# Patient Record
Sex: MALE | Race: WHITE | Employment: FULL TIME | URBAN - METROPOLITAN AREA
[De-identification: names, ages, dates, MRNs, and addresses within clinical notes are randomized per-mention and may not be internally consistent; named-entity substitution may affect disease eponyms.]

---

## 2017-01-31 PROCEDURE — 82746 ASSAY OF FOLIC ACID SERUM: CPT | Performed by: INTERNAL MEDICINE

## 2017-01-31 PROCEDURE — 82607 VITAMIN B-12: CPT | Performed by: INTERNAL MEDICINE

## 2017-12-27 PROCEDURE — 87086 URINE CULTURE/COLONY COUNT: CPT | Performed by: FAMILY MEDICINE

## 2019-03-11 PROCEDURE — 82785 ASSAY OF IGE: CPT | Performed by: INTERNAL MEDICINE

## 2019-03-11 PROCEDURE — 86003 ALLG SPEC IGE CRUDE XTRC EA: CPT | Performed by: INTERNAL MEDICINE

## 2021-05-30 PROBLEM — R10.32 LEFT LOWER QUADRANT PAIN: Status: ACTIVE | Noted: 2021-05-30

## 2022-08-06 ENCOUNTER — LAB ENCOUNTER (OUTPATIENT)
Dept: LAB | Age: 52
End: 2022-08-06
Attending: INTERNAL MEDICINE
Payer: COMMERCIAL

## 2022-08-06 DIAGNOSIS — Z01.812 ENCOUNTER FOR PREOPERATIVE SCREENING LABORATORY TESTING FOR COVID-19 VIRUS: ICD-10-CM

## 2022-08-06 DIAGNOSIS — Z20.822 ENCOUNTER FOR PREOPERATIVE SCREENING LABORATORY TESTING FOR COVID-19 VIRUS: ICD-10-CM

## 2022-08-07 LAB — SARS-COV-2 RNA RESP QL NAA+PROBE: NOT DETECTED

## 2022-08-08 ENCOUNTER — ANESTHESIA EVENT (OUTPATIENT)
Dept: ENDOSCOPY | Facility: HOSPITAL | Age: 52
End: 2022-08-08
Payer: COMMERCIAL

## 2022-08-08 ENCOUNTER — ANESTHESIA (OUTPATIENT)
Dept: ENDOSCOPY | Facility: HOSPITAL | Age: 52
End: 2022-08-08
Payer: COMMERCIAL

## 2022-08-08 ENCOUNTER — HOSPITAL ENCOUNTER (OUTPATIENT)
Facility: HOSPITAL | Age: 52
Setting detail: HOSPITAL OUTPATIENT SURGERY
Discharge: HOME | End: 2022-08-08
Attending: INTERNAL MEDICINE | Admitting: INTERNAL MEDICINE
Payer: COMMERCIAL

## 2022-08-08 VITALS
OXYGEN SATURATION: 98 % | HEART RATE: 75 BPM | WEIGHT: 165 LBS | DIASTOLIC BLOOD PRESSURE: 96 MMHG | SYSTOLIC BLOOD PRESSURE: 146 MMHG | RESPIRATION RATE: 20 BRPM | BODY MASS INDEX: 23.62 KG/M2 | HEIGHT: 70 IN | TEMPERATURE: 98 F

## 2022-08-08 DIAGNOSIS — Z01.812 ENCOUNTER FOR PREOPERATIVE SCREENING LABORATORY TESTING FOR COVID-19 VIRUS: Primary | ICD-10-CM

## 2022-08-08 DIAGNOSIS — Z20.822 ENCOUNTER FOR PREOPERATIVE SCREENING LABORATORY TESTING FOR COVID-19 VIRUS: Primary | ICD-10-CM

## 2022-08-08 PROCEDURE — BF47ZZZ ULTRASONOGRAPHY OF PANCREAS: ICD-10-PCS | Performed by: INTERNAL MEDICINE

## 2022-08-08 PROCEDURE — 88173 CYTOPATH EVAL FNA REPORT: CPT | Performed by: INTERNAL MEDICINE

## 2022-08-08 PROCEDURE — 88305 TISSUE EXAM BY PATHOLOGIST: CPT | Performed by: INTERNAL MEDICINE

## 2022-08-08 PROCEDURE — 88307 TISSUE EXAM BY PATHOLOGIST: CPT | Performed by: INTERNAL MEDICINE

## 2022-08-08 PROCEDURE — 0FDG8ZX EXTRACTION OF PANCREAS, VIA NATURAL OR ARTIFICIAL OPENING ENDOSCOPIC, DIAGNOSTIC: ICD-10-PCS | Performed by: INTERNAL MEDICINE

## 2022-08-08 PROCEDURE — 0DB78ZX EXCISION OF STOMACH, PYLORUS, VIA NATURAL OR ARTIFICIAL OPENING ENDOSCOPIC, DIAGNOSTIC: ICD-10-PCS | Performed by: INTERNAL MEDICINE

## 2022-08-08 PROCEDURE — 82150 ASSAY OF AMYLASE: CPT | Performed by: INTERNAL MEDICINE

## 2022-08-08 PROCEDURE — 88341 IMHCHEM/IMCYTCHM EA ADD ANTB: CPT | Performed by: INTERNAL MEDICINE

## 2022-08-08 PROCEDURE — 88321 CONSLTJ&REPRT SLD PREP ELSWR: CPT | Performed by: INTERNAL MEDICINE

## 2022-08-08 PROCEDURE — 82378 CARCINOEMBRYONIC ANTIGEN: CPT | Performed by: INTERNAL MEDICINE

## 2022-08-08 PROCEDURE — 88313 SPECIAL STAINS GROUP 2: CPT | Performed by: INTERNAL MEDICINE

## 2022-08-08 PROCEDURE — 88342 IMHCHEM/IMCYTCHM 1ST ANTB: CPT | Performed by: INTERNAL MEDICINE

## 2022-08-08 RX ORDER — LEVOFLOXACIN 5 MG/ML
500 INJECTION, SOLUTION INTRAVENOUS ONCE
Status: COMPLETED | OUTPATIENT
Start: 2022-08-08 | End: 2022-08-08

## 2022-08-08 RX ORDER — SODIUM CHLORIDE, SODIUM LACTATE, POTASSIUM CHLORIDE, CALCIUM CHLORIDE 600; 310; 30; 20 MG/100ML; MG/100ML; MG/100ML; MG/100ML
INJECTION, SOLUTION INTRAVENOUS CONTINUOUS
Status: DISCONTINUED | OUTPATIENT
Start: 2022-08-08 | End: 2022-08-08

## 2022-08-08 RX ORDER — LIDOCAINE HYDROCHLORIDE 10 MG/ML
INJECTION, SOLUTION EPIDURAL; INFILTRATION; INTRACAUDAL; PERINEURAL AS NEEDED
Status: DISCONTINUED | OUTPATIENT
Start: 2022-08-08 | End: 2022-08-08 | Stop reason: SURG

## 2022-08-08 RX ORDER — LEVOFLOXACIN 5 MG/ML
INJECTION, SOLUTION INTRAVENOUS
Status: DISCONTINUED
Start: 2022-08-08 | End: 2022-08-08 | Stop reason: WASHOUT

## 2022-08-08 RX ORDER — LEVOFLOXACIN 5 MG/ML
INJECTION, SOLUTION INTRAVENOUS
Status: DISCONTINUED
Start: 2022-08-08 | End: 2022-08-08

## 2022-08-08 RX ORDER — NALOXONE HYDROCHLORIDE 0.4 MG/ML
80 INJECTION, SOLUTION INTRAMUSCULAR; INTRAVENOUS; SUBCUTANEOUS AS NEEDED
Status: DISCONTINUED | OUTPATIENT
Start: 2022-08-08 | End: 2022-08-08

## 2022-08-08 RX ORDER — ONDANSETRON 2 MG/ML
4 INJECTION INTRAMUSCULAR; INTRAVENOUS AS NEEDED
Status: DISCONTINUED | OUTPATIENT
Start: 2022-08-08 | End: 2022-08-08

## 2022-08-08 RX ORDER — LEVOFLOXACIN 5 MG/ML
500 INJECTION, SOLUTION INTRAVENOUS ONCE
Status: DISCONTINUED | OUTPATIENT
Start: 2022-08-08 | End: 2022-08-08

## 2022-08-08 RX ADMIN — SODIUM CHLORIDE, SODIUM LACTATE, POTASSIUM CHLORIDE, CALCIUM CHLORIDE: 600; 310; 30; 20 INJECTION, SOLUTION INTRAVENOUS at 08:46:00

## 2022-08-08 RX ADMIN — LIDOCAINE HYDROCHLORIDE 30 MG: 10 INJECTION, SOLUTION EPIDURAL; INFILTRATION; INTRACAUDAL; PERINEURAL at 08:10:00

## 2022-08-08 RX ADMIN — SODIUM CHLORIDE, SODIUM LACTATE, POTASSIUM CHLORIDE, CALCIUM CHLORIDE: 600; 310; 30; 20 INJECTION, SOLUTION INTRAVENOUS at 08:04:00

## 2022-08-08 RX ADMIN — LEVOFLOXACIN 500 MG: 5 INJECTION, SOLUTION INTRAVENOUS at 08:26:00

## 2022-08-08 NOTE — ANESTHESIA POSTPROCEDURE EVALUATION
[id number] [facility] 151 Patient Status:  Hospital Outpatient Surgery   Age/Gender 46year old male MRN [id number]   Location [address] [address] Attending Reid Cleary MD   Hosp Day # 0 PCP Marquis Fuller MD       Anesthesia Post-op Note    ESOPHAGOGASTRODUODENOSCOPY,  ENDOSCOPIC ULTRASOUND WITH FINE NEEDLE BIOPSY    Procedure Summary     Date: 08/08/22 Room / Location: [facility] ENDOSCOPY 02 / [facility] ENDOSCOPY    Anesthesia Start: 0804 Anesthesia Stop: 8552    Procedures:       ESOPHAGOGASTRODUODENOSCOPY, ENDOSCOPIC ULTRASOUND WITH FINE NEEDLE BIOPSY (N/A )      ENDOSCOPIC ULTRASOUND WITH FINE NEEDLE BIOPSY (N/A ) Diagnosis: (Hiatal hernia, Gastritis, Pancreas tail lesion)    Surgeons: Reid Cleary MD Anesthesiologist: Ginger Downey MD    Anesthesia Type: MAC ASA Status: 2          Anesthesia Type: MAC    Vitals Value Taken Time   /92 08/08/22 0847   Temp  08/08/22 0850   Pulse 64 08/08/22 0849   Resp 16 08/08/22 0850   SpO2 97 % 08/08/22 0849   Vitals shown include unvalidated device data.     Patient Location: Endoscopy    Anesthesia Type: MAC    Airway Patency: patent    Postop Pain Control: adequate    Mental Status: mildly sedated but able to meaningfully participate in the post-anesthesia evaluation    Nausea/Vomiting: none    Cardiopulmonary/Hydration status: stable euvolemic    Complications: no apparent anesthesia related complications    Postop vital signs: stable    Dental Exam: Unchanged from Postop

## 2022-08-08 NOTE — OPERATIVE REPORT
OPERATIVE REPORT   PATIENT NAME: Abad Shepherd  MRN: [id number]  DATE OF OPERATION: 8/8/2022  PREOPERATIVE DIAGNOSIS:   1. Pancreatic tail mass  POSTOPERATIVE DIAGNOSES:  1. Small sliding hiatal hernia, Hill grade 2.  2. Patchy erythema in the antrum consistent with mild gastritis. 3. Pancreatic tail mass predominantly solid with few areas of cystic components most likely degenerative changes, possible neuroendocrine tumor  PROCEDURE PERFORMED:  1. Upper endoscopy with biopsy. 2.  Linear endoscopic ultrasound with fine-needle biopsy  SURGEON: Amy Tejeda MD   MEDICATIONS: Levaquin 500 mg IV piggyback x1 was given as to minimize procedure related infection    ANESTHESIA: MAC  CONSENT: The potential risks including but not limited to perforation, bleeding, infection, pancreatitis, medication reaction, complication leading up to prolonged hospital stay needing surgical discussed with him in details. He was given opportunity to ask questions and all his questions were answered. Alternatives and options harsh with him he signed informed consent  SPECIMEN: Pancreatic tail mass (histology), pancreatic tail cystic fluid sent for CEA, amylase, and glucose, gastric biopsies  COMPLICATIONS: None immediately apparent  PROCEDURE AND FINDINGS:     Upper endoscopy:    After the risks and benefits of the procedure were discussed with the patient and all questions were answered, the patient signed informed consent for the procedure. I discussed the rationale for the procedure as well as the risks and benefits, with the risks including but not limited to bleeding, perforation, medication adverse event and missed lesions. He was placed in the left lateral position and once an adequate level of  sedation was achieved, the lubricated tip of an Olympus video gastroscope was introduced into the mouth and the esophagus was intubated under direct visualization.  A complete examination of the esophagus, stomach and duodenum was performed including retroflexion in the stomach to view the cardia and fundus. The endoscope was straightened and removed, and the procedure was completed. The patient tolerated the procedure well. There were no immediate complications. The patient was given a written copy of their results at discharge. FINDINGS:  1. Normal esophagus with no evidence of esophagitis, stricture, ulceration, mass or other abnormalities. The squamocolumnar junction was intact. The esophagogastric junction was patent. There were no endoscopic features of eosinophilic esophagitis or Caruso's esophagus. The gastroesophageal junction flap valve Hill grade 2  2. Normal stomach throughout with no evidence of ulcers, masses or other abnormalities. Retroflexion revealed a normal cardia and fundus. Few patchy erythema was seen in the antrum and biopsies were taken to rule out H. pylori gastritis  3. Normal duodenum to the second portion with no ulcers or masses seen. Endoscopic ultrasound: While the patient in the left lateral decubitus position and adequately sedated the Olympus linear echoendoscope was introduced under direct visualization in the esophagus passed into the stomach and second portion of the duodenum. The pancreatic body and tail were examined from the stomach. The tail of the pancreas there was a large hypoechoic mass measuring approximately 3.7 x 2.4 cm with few areas of cystic components and it most likely related degenerative changes. The lesion was sampled with the [place] 233 22-gauge fine-needle biopsy. X4 needle passes. The material was sent for histology. During 1 passes of the needles the largest cystic component measures approximately 1 cm was entered and thus the fluid was suctioned to collapse it. The fluid was sent for CEA, amylase, and glucose. The fluid appeared to be thin but blood-tinged. The cyst collapsed post aspiration. The main pancreatic duct did not appear to be dilated. The head of the pancreas and the uncinate process were then examined from the second portion of the duodenum as well as duodenal bulb. No obvious abnormality was seen. No obvious mass lesion noted. No obvious peripancreatic lymphadenopathy seen. The scope at this point was removed. IMPRESSION:  1. Findings as described above. The pancreatic tail lesion was predominantly solid and could represent neuroendocrine tumor. The cystic component is most likely genetic degenerative changes. RECOMMENDATIONS:  1. Levaquin five 1 mg p.o. daily for 4 days starting tomorrow. 2. Clear liquid diet for today then advance as tolerated. 3. Avoid NSAIDs for 7 days.   Cassandra Garber MD

## (undated) DEVICE — Device: Brand: DEFENDO AIR/WATER/SUCTION AND BIOPSY VALVE

## (undated) DEVICE — ENDOSCOPY PACK UPPER: Brand: MEDLINE INDUSTRIES, INC.

## (undated) DEVICE — FILTERLINE NASAL ADULT O2/CO2

## (undated) DEVICE — ENDOSCOPY PACK - LOWER: Brand: MEDLINE INDUSTRIES, INC.

## (undated) DEVICE — ENDOSCOPIC ULTRASOUND FINE NEEDLE BIOPSY (FNB) DEVICE: Brand: ACQUIRE

## (undated) DEVICE — FORCEP RADIAL JAW 4

## (undated) DEVICE — 3M™ RED DOT™ MONITORING ELECTRODE WITH FOAM TAPE AND STICKY GEL, 50/BAG, 20/CASE, 72/PLT 2570: Brand: RED DOT™

## (undated) DEVICE — 1200CC GUARDIAN II: Brand: GUARDIAN